# Patient Record
Sex: FEMALE | Race: WHITE | NOT HISPANIC OR LATINO
[De-identification: names, ages, dates, MRNs, and addresses within clinical notes are randomized per-mention and may not be internally consistent; named-entity substitution may affect disease eponyms.]

---

## 2024-01-02 ENCOUNTER — NON-APPOINTMENT (OUTPATIENT)
Age: 34
End: 2024-01-02

## 2024-01-07 ENCOUNTER — EMERGENCY (EMERGENCY)
Facility: HOSPITAL | Age: 34
LOS: 1 days | Discharge: HOME | End: 2024-01-07
Attending: EMERGENCY MEDICINE
Payer: MEDICARE

## 2024-01-07 VITALS
OXYGEN SATURATION: 94 % | HEART RATE: 97 BPM | TEMPERATURE: 98 F | WEIGHT: 190.04 LBS | RESPIRATION RATE: 16 BRPM | SYSTOLIC BLOOD PRESSURE: 114 MMHG | DIASTOLIC BLOOD PRESSURE: 75 MMHG

## 2024-01-07 PROCEDURE — 99283 EMERGENCY DEPT VISIT LOW MDM: CPT

## 2024-01-07 PROCEDURE — 87186 SC STD MICRODIL/AGAR DIL: CPT

## 2024-01-07 PROCEDURE — 10060 I&D ABSCESS SIMPLE/SINGLE: CPT

## 2024-01-07 PROCEDURE — 82962 GLUCOSE BLOOD TEST: CPT

## 2024-01-07 PROCEDURE — 87077 CULTURE AEROBIC IDENTIFY: CPT

## 2024-01-07 PROCEDURE — 99284 EMERGENCY DEPT VISIT MOD MDM: CPT | Mod: 25

## 2024-01-07 PROCEDURE — 87070 CULTURE OTHR SPECIMN AEROBIC: CPT

## 2024-01-07 RX ORDER — OXYCODONE AND ACETAMINOPHEN 5; 325 MG/1; MG/1
1 TABLET ORAL ONCE
Refills: 0 | Status: DISCONTINUED | OUTPATIENT
Start: 2024-01-07 | End: 2024-01-07

## 2024-01-07 RX ORDER — ACETAMINOPHEN 500 MG
650 TABLET ORAL ONCE
Refills: 0 | Status: COMPLETED | OUTPATIENT
Start: 2024-01-07 | End: 2024-01-07

## 2024-01-07 RX ORDER — CEPHALEXIN 500 MG
1 CAPSULE ORAL
Qty: 21 | Refills: 0
Start: 2024-01-07 | End: 2024-01-13

## 2024-01-07 RX ORDER — LIDOCAINE HCL 20 MG/ML
5 VIAL (ML) INJECTION ONCE
Refills: 0 | Status: COMPLETED | OUTPATIENT
Start: 2024-01-07 | End: 2024-01-07

## 2024-01-07 RX ORDER — SACCHAROMYCES BOULARDII 250 MG
1 POWDER IN PACKET (EA) ORAL
Qty: 20 | Refills: 0
Start: 2024-01-07 | End: 2024-01-16

## 2024-01-07 RX ADMIN — OXYCODONE AND ACETAMINOPHEN 1 TABLET(S): 5; 325 TABLET ORAL at 05:42

## 2024-01-07 RX ADMIN — Medication 5 MILLILITER(S): at 05:45

## 2024-01-07 RX ADMIN — Medication 650 MILLIGRAM(S): at 06:12

## 2024-01-07 NOTE — ED PROVIDER NOTE - NSFOLLOWUPINSTRUCTIONS_ED_ALL_ED_FT
continue antibiotics Bactrim DS twice a daily every 12 hours and Keflex every 8 hours   take the probiotic as prescribed  - Tylenol 650 mg alternative ibuprofen 600 mg every 6-8 hours as needed for the pain  - come back to the emergency room for 3 days for wound check and packing removal-  or if he can follow-up with surgery clinic for further evaluation  - continue applying a warm compress over the area  Incision and Drainage, Care After    This sheet gives you information about how to care for yourself after your procedure. Your health care provider may also give you more specific instructions. If you have problems or questions, contact your health care provider.    What can I expect after the procedure?  After the procedure, it is common to have:    Pain or discomfort around the incision site.  Blood, fluid, or pus (drainage) from the incision.  Redness and firm skin around the incision site.    Follow these instructions at home:      Medicines    Take over-the-counter and prescription medicines only as told by your health care provider.  If you were prescribed an antibiotic medicine, use or take it as told by your health care provider. Do not stop using the antibiotic even if you start to feel better.        Wound care     Follow instructions from your health care provider about how to take care of your wound. Make sure you:    Wash your hands with soap and water before and after you change your bandage (dressing). If soap and water are not available, use hand .  Change your dressing and packing as told by your health care provider.    If your dressing is dry or stuck when you try to remove it, moisten or wet the dressing with saline or water so that it can be removed without harming your skin or tissues.  If your wound is packed, leave it in place until your health care provider tells you to remove it. To remove the packing, moisten or wet the packing with saline or water so that it can be removed without harming your skin or tissues.  Leave stitches (sutures), skin glue, or adhesive strips in place. These skin closures may need to stay in place for 2 weeks or longer. If adhesive strip edges start to loosen and curl up, you may trim the loose edges. Do not remove adhesive strips completely unless your health care provider tells you to do that.    Check your wound every day for signs of infection. Check for:    More redness, swelling, or pain.  More fluid or blood.  Warmth.  Pus or a bad smell.    If you were sent home with a drain tube in place, follow instructions from your health care provider about:    How to empty it.  How to care for it at home.        General instructions    Rest the affected area.  Do not take baths, swim, or use a hot tub until your health care provider approves. Ask your health care provider if you may take showers. You may only be allowed to take sponge baths.  Return to your normal activities as told by your health care provider. Ask your health care provider what activities are safe for you. Your health care provider may put you on activity or lifting restrictions.  The incision will continue to drain. It is normal to have some clear or slightly bloody drainage. The amount of drainage should lessen each day.  Do not apply any creams, ointments, or liquids unless you have been told to by your health care provider.  Keep all follow-up visits as told by your health care provider. This is important.    Contact a health care provider if:  Your cyst or abscess returns.  You have a fever or chills.  You have more redness, swelling, or pain around your incision.  You have more fluid or blood coming from your incision.  Your incision feels warm to the touch.  You have pus or a bad smell coming from your incision.  You have red streaks above or below the incision site.    Get help right away if:  You have severe pain or bleeding.  You cannot eat or drink without vomiting.  You have decreased urine output.  You become short of breath.  You have chest pain.  You cough up blood.  The affected area becomes numb or starts to tingle.    These symptoms may represent a serious problem that is an emergency. Do not wait to see if the symptoms will go away. Get medical help right away. Call your local emergency services (911 in the [place] Do not drive yourself to the hospital.    Summary  After this procedure, it is common to have fluid, blood, or pus coming from the surgery site.  Follow all home care instructions. You will be told how to take care of your incision, how to check for infection, and how to take medicines.  If you were prescribed an antibiotic medicine, take it as told by your health care provider. Do not stop taking the antibiotic even if you start to feel better.  Contact a health care provider if you have increased redness, swelling, or pain around your incision. Get help right away if you have chest pain, you vomit, you cough up blood, or you have shortness of breath.  Keep all follow-up visits as told by your health care provider. This is important.    ADDITIONAL NOTES AND INSTRUCTIONS    Please follow up with your Primary MD in 24-48 hr.  Seek immediate medical care for any new/worsening signs or symptoms.

## 2024-01-07 NOTE — ED ADULT NURSE NOTE - NSFALLUNIVINTERV_ED_ALL_ED
Bed/Stretcher in lowest position, wheels locked, appropriate side rails in place/Call bell, personal items and telephone in reach/Instruct patient to call for assistance before getting out of bed/chair/stretcher/Non-slip footwear applied when patient is off stretcher/[place] to call system/Physically safe environment - no spills, clutter or unnecessary equipment/Purposeful proactive rounding/Room/bathroom lighting operational, light cord in reach Bed/Stretcher in lowest position, wheels locked, appropriate side rails in place/Call bell, personal items and telephone in reach/Instruct patient to call for assistance before getting out of bed/chair/stretcher/Non-slip footwear applied when patient is off stretcher/[place] to call system/Physically safe environment - no spills, clutter or unnecessary equipment/Purposeful proactive rounding/Room/bathroom lighting operational, light cord in reach

## 2024-01-07 NOTE — ED PROVIDER NOTE - ATTENDING APP SHARED VISIT CONTRIBUTION OF CARE
cutaneous abscess of left upper back; no fever; no axillary LAD; mild surrounding erythema +ttp with induration; s/p I&D as per ACP with improvement; antibxs and symptomatic treatment recommended and understood; agree with acp plan of care    This was a shared visit with MARYLIN. I reviewed and verified the documentation and independently performed the documented history/exam/mdm.

## 2024-01-07 NOTE — ED ADULT TRIAGE NOTE - CHIEF COMPLAINT QUOTE
Pt BIBA from home, c/o lump on left upper back x 1 week w/pain and yellow/green puss drainage.  Pt seen at UC 3 days ago, given abx, states it popped twice and excessive drainage.

## 2024-01-07 NOTE — ED PROCEDURE NOTE - CPROC ED LOCAL ANESTHESIA1
Call your physician immediately if you notice any of the following symptoms of a blood clot:   Sudden weakness in any limb  Numbness or tingling anywhere  Visual changes or loss of sight in either eye  Sudden onset of slurred speech or inability to speak  Dizziness or faintness  New pain, swelling, redness or heat in any extremity  New SOB or chest pain     8/2% lidocaine

## 2024-01-07 NOTE — ED PROVIDER NOTE - PATIENT PORTAL LINK FT
You can access the FollowMyHealth Patient Portal offered by [facility] by registering at the following website: [email or web address]. By joining [employer]’s FollowMyHealth portal, you will also be able to view your health information using other applications (apps) compatible with our system. You can access the FollowMyHealth Patient Portal offered by [facility] by registering at the following website: [email or web address]. By joining [employer] [facility]’s FollowMyHealth portal, you will also be able to view your health information using other applications (apps) compatible with our system.

## 2024-01-07 NOTE — ED PROVIDER NOTE - NSFOLLOWUPCLINICS_GEN_ALL_ED_FT
[facility] General Surgery  Surgery  [address] [place] [address]  Phone: [phone]  Fax:   Follow Up Time: 1-3 Days     [facility] General Surgery  Surgery  [address] [place] [address]  Phone: [phone]  Fax:   Follow Up Time: 1-3 Days

## 2024-01-07 NOTE — ED PROVIDER NOTE - OBJECTIVE STATEMENT
33 years old female no past medical history presented emergency room complaining of the abscess on her upper back that she had this for 1 week.  start from small pimple gets bigger over the time.  she had a follow-up with urgent care they started her on antibiotics? Bactrim she is taking it twice daily.  now is draining out.  she is taking ibuprofen 600 mg last dose was 2 hours PTA.  denies any fever at home.  patient states the  area of the abscess it hurts a lot requesting pain medication.  denies any pregnancy she is currently on her menstruation 33 years old female Past medical history of the gestational diabetes during pregnancy presented emergency room  with her  and children complaining of the abscess on her upper back that she had this for 1 week.  start from small pimple gets bigger over the time.  she had a follow-up with urgent care they started her on antibiotics? Bactrim she is taking it twice daily.  now is draining out.  she is taking ibuprofen 600 mg last dose was 2 hours PTA.  denies any fever at home.  patient states the  area of the abscess it hurts a lot requesting pain medication.  denies any pregnancy she is currently on her menstruation

## 2024-01-07 NOTE — ED PROVIDER NOTE - CLINICAL SUMMARY MEDICAL DECISION MAKING FREE TEXT BOX
33 years old female no past medical history presented emergency room complaining of the abscess on her upper back that she had this for 1 week.  start from small pimple gets bigger over the time.  she had a follow-up with urgent care they started her on antibiotics? Bactrim she is taking it twice daily.  now is draining out.  she is taking ibuprofen 600 mg last dose was 2 hours PTA.  denies any fever at home.  patient states the  area of the abscess it hurts a lot requesting pain medication.  denies any pregnancy she is currently on her menstruation     performing I&D,  pain mid Percocet 5 mg.  continue Bactrim patient has a few tabs at home

## 2024-01-07 NOTE — ED PROCEDURE NOTE - CPROC ED TIME OUT STATEMENT1
“Patient's name, , procedure and correct site were confirmed during the [place] Timeout.” “Patient's name, , procedure and correct site were confirmed during the [place] Timeout.”

## 2024-01-07 NOTE — ED ADULT NURSE NOTE - OBJECTIVE STATEMENT
Pt is A&Ox4. Respirations are even and unlabored. Color is appropriate for race. Skin warm and dry. Pt reports lump on left upper back x 1 week w/pain and yellow/green puss drainage.  Pt seen at  3 days ago, given abx, states it popped twice and excessive drainage. Lump appears to be size of quarter, +drainage, and + foul smelling order noted to left upper back. ED provider evaluating, plan of care ongoing.

## 2024-01-07 NOTE — ED PROVIDER NOTE - PHYSICAL EXAMINATION
Const: AOX3 nontoxic appearing, no apparent respiratory or physical distress. obese female   HEENT: NC/AT. Moist mucous membranes.  Eyes: BALTAZAR. EOMI  Neck: Soft and supple. Full ROM without pain.  Cardiac: Regular rate and regular rhythm. +S1/S2  Resp: Speaking in full sentences. No evidence of respiratory distress.  Skin:  left upper back almost near the scapular area 4 cm abscess noted- yellow on the top some heart and small amount of the fluctuation noted. and surrounding erythema noted  Neuro: Awake, alert & oriented x 3. Moves all extremities symmetrically. Const: AOX3 nontoxic appearing, no apparent respiratory or physical distress. obese female   HEENT: NC/AT. Moist mucous membranes.  Eyes: BALTAZAR. EOMI  Neck: Soft and supple. Full ROM without pain.  Cardiac: Regular rate and regular rhythm. +S1/S2  Resp: Speaking in full sentences. No evidence of respiratory distress.  Skin:  left upper back almost near the scapular area 4 cm abscess noted- yellow on the top some heart and small amount of the fluctuation noted. surrounding erythema noted.  +  right axilla adenopathy  Neuro: Awake, alert & oriented x 3. Moves all extremities symmetrically.

## 2024-01-09 LAB
-  AMPICILLIN/SULBACTAM: SIGNIFICANT CHANGE UP
-  AMPICILLIN/SULBACTAM: SIGNIFICANT CHANGE UP
-  CEFAZOLIN: SIGNIFICANT CHANGE UP
-  CEFAZOLIN: SIGNIFICANT CHANGE UP
-  CLINDAMYCIN: SIGNIFICANT CHANGE UP
-  CLINDAMYCIN: SIGNIFICANT CHANGE UP
-  DAPTOMYCIN: SIGNIFICANT CHANGE UP
-  DAPTOMYCIN: SIGNIFICANT CHANGE UP
-  ERYTHROMYCIN: SIGNIFICANT CHANGE UP
-  ERYTHROMYCIN: SIGNIFICANT CHANGE UP
-  GENTAMICIN: SIGNIFICANT CHANGE UP
-  GENTAMICIN: SIGNIFICANT CHANGE UP
-  LINEZOLID: SIGNIFICANT CHANGE UP
-  LINEZOLID: SIGNIFICANT CHANGE UP
-  OXACILLIN: SIGNIFICANT CHANGE UP
-  OXACILLIN: SIGNIFICANT CHANGE UP
-  PENICILLIN: SIGNIFICANT CHANGE UP
-  PENICILLIN: SIGNIFICANT CHANGE UP
-  RIFAMPIN: SIGNIFICANT CHANGE UP
-  RIFAMPIN: SIGNIFICANT CHANGE UP
-  TETRACYCLINE: SIGNIFICANT CHANGE UP
-  TETRACYCLINE: SIGNIFICANT CHANGE UP
-  TRIMETHOPRIM/SULFAMETHOXAZOLE: SIGNIFICANT CHANGE UP
-  TRIMETHOPRIM/SULFAMETHOXAZOLE: SIGNIFICANT CHANGE UP
-  VANCOMYCIN: SIGNIFICANT CHANGE UP
-  VANCOMYCIN: SIGNIFICANT CHANGE UP
CULTURE RESULTS: ABNORMAL
METHOD TYPE: SIGNIFICANT CHANGE UP
METHOD TYPE: SIGNIFICANT CHANGE UP
ORGANISM # SPEC MICROSCOPIC CNT: ABNORMAL
ORGANISM # SPEC MICROSCOPIC CNT: ABNORMAL
ORGANISM # SPEC MICROSCOPIC CNT: SIGNIFICANT CHANGE UP
ORGANISM # SPEC MICROSCOPIC CNT: SIGNIFICANT CHANGE UP
SPECIMEN SOURCE: SIGNIFICANT CHANGE UP
SPECIMEN SOURCE: SIGNIFICANT CHANGE UP

## 2024-01-10 ENCOUNTER — EMERGENCY (EMERGENCY)
Facility: HOSPITAL | Age: 34
LOS: 1 days | Discharge: HOME | End: 2024-01-10
Attending: EMERGENCY MEDICINE
Payer: MEDICARE

## 2024-01-10 VITALS
HEART RATE: 92 BPM | SYSTOLIC BLOOD PRESSURE: 169 MMHG | RESPIRATION RATE: 16 BRPM | DIASTOLIC BLOOD PRESSURE: 99 MMHG | OXYGEN SATURATION: 96 % | TEMPERATURE: 99 F

## 2024-01-10 VITALS
HEART RATE: 119 BPM | SYSTOLIC BLOOD PRESSURE: 170 MMHG | OXYGEN SATURATION: 95 % | DIASTOLIC BLOOD PRESSURE: 100 MMHG | TEMPERATURE: 99 F | WEIGHT: 190.04 LBS | RESPIRATION RATE: 18 BRPM | HEIGHT: 61 IN

## 2024-01-10 PROCEDURE — 99284 EMERGENCY DEPT VISIT MOD MDM: CPT

## 2024-01-10 PROCEDURE — 99283 EMERGENCY DEPT VISIT LOW MDM: CPT

## 2024-01-10 RX ORDER — IBUPROFEN 200 MG
600 TABLET ORAL ONCE
Refills: 0 | Status: COMPLETED | OUTPATIENT
Start: 2024-01-10 | End: 2024-01-10

## 2024-01-10 RX ORDER — OXYCODONE AND ACETAMINOPHEN 5; 325 MG/1; MG/1
1 TABLET ORAL ONCE
Refills: 0 | Status: DISCONTINUED | OUTPATIENT
Start: 2024-01-10 | End: 2024-01-10

## 2024-01-10 RX ORDER — IBUPROFEN 200 MG
1 TABLET ORAL
Qty: 20 | Refills: 0
Start: 2024-01-10 | End: 2024-01-14

## 2024-01-10 RX ADMIN — Medication 600 MILLIGRAM(S): at 18:40

## 2024-01-10 RX ADMIN — OXYCODONE AND ACETAMINOPHEN 1 TABLET(S): 5; 325 TABLET ORAL at 18:40

## 2024-01-10 RX ADMIN — Medication 2 TABLET(S): at 18:40

## 2024-01-10 NOTE — ED PROVIDER NOTE - ATTENDING APP SHARED VISIT CONTRIBUTION OF CARE
I, Denis Alexandra, performed the initial face to face bedside interview with this patient regarding history of present illness, review of symptoms and relevant past medical, social and family history.  I completed an independent physical examination.  I was the initial provider who evaluated this patient. I have signed out the follow up of any pending tests (i.e. labs, radiological studies) to the ACP.  I have communicated the patient’s plan of care and disposition with the ACP.

## 2024-01-10 NOTE — ED PROVIDER NOTE - PROGRESS NOTE DETAILS
Gil Diaz PA-C: Pt will require daily wet to dry dressing changes, superficial debridements, and Cleasing with soap and water.

## 2024-01-10 NOTE — ED PROVIDER NOTE - NSFOLLOWUPCLINICSTOKEN_GEN_ALL_ED_FT
[id number]: || ||00\01||False;[id number]: || ||00\01||False; [id number]: || ||00\01||False;[id number]: || ||00\01||False; [id number]: || ||00\01||False;[id number]: || ||00\01||False;

## 2024-01-10 NOTE — DISCHARGE NOTE NURSING/CASE MANAGEMENT/SOCIAL WORK - NSDCFUADDAPPT_GEN_ALL_CORE_FT
[facility] to start Home Care Nursing upon approval, please contact above number for more information.  [facility] to start Home Care Nursing upon approval, please contact above number for more information.

## 2024-01-10 NOTE — ED ADULT NURSE NOTE - NSFALLUNIVINTERV_ED_ALL_ED
Bed/Stretcher in lowest position, wheels locked, appropriate side rails in place/Call bell, personal items and telephone in reach/Instruct patient to call for assistance before getting out of bed/chair/stretcher/Non-slip footwear applied when patient is off stretcher/[place] to call system/Physically safe environment - no spills, clutter or unnecessary equipment/Purposeful proactive rounding/Room/bathroom lighting operational, light cord in reach Bed/Stretcher in lowest position, wheels locked, appropriate side rails in place/Call bell, personal items and telephone in reach/Instruct patient to call for assistance before getting out of bed/chair/stretcher/Non-slip footwear applied when patient is off stretcher/[place] to call system/Physically safe environment - no spills, clutter or unnecessary equipment/Purposeful proactive rounding/Room/bathroom lighting operational, light cord in reach

## 2024-01-10 NOTE — ED PROVIDER NOTE - PATIENT PORTAL LINK FT
You can access the FollowMyHealth Patient Portal offered by [facility] by registering at the following website: [email or web address]. By joining [employer]’s FollowMyHealth portal, you will also be able to view your health information using other applications (apps) compatible with our system. You can access the FollowMyHealth Patient Portal offered by [facility] by registering at the following website: [email or web address]. By joining [employer]’s FollowMyHealth portal, you will also be able to view your health information using other applications (apps) compatible with our system. You can access the FollowMyHealth Patient Portal offered by [facility] by registering at the following website: [email or web address]. By joining [employer]’s FollowMyHealth portal, you will also be able to view your health information using other applications (apps) compatible with our system.

## 2024-01-10 NOTE — DISCHARGE NOTE NURSING/CASE MANAGEMENT/SOCIAL WORK - PATIENT PORTAL LINK FT
You can access the FollowMyHealth Patient Portal offered by [facility] by registering at the following website: [email or web address]. By joining [employer]’s FollowMyHealth portal, you will also be able to view your health information using other applications (apps) compatible with our system. You can access the FollowMyHealth Patient Portal offered by [facility] by registering at the following website: [email or web address]. By joining [employer]’s FollowMyHealth portal, you will also be able to view your health information using other applications (apps) compatible with our system.

## 2024-01-10 NOTE — ED PROVIDER NOTE - CLINICAL SUMMARY MEDICAL DECISION MAKING FREE TEXT BOX
Patient with no significant past medical history presents to the emergency department for evaluation of wound over left shoulder.  Patient states that she was here recently had an I&D, has not gotten antibiotics from the pharmacy as prescribed.  Patient states that she feels wound is improved but not resolved.  Patient with some moderate amount of pain over the area that is not made better or worse by anything.  Patient denies fever chills nausea vomiting headache dizziness weakness.  On exam patient with 3 cm ulcer with active greenish discharge without surrounding erythema or swelling.  Cultures from last visit reviewed antibiotics resent to the pharmacy patient will require wet-to-dry dressing for ulcerative wound contacted case management to help arrange follow-up patient be discharged home as above follow-up surgical clinic.  Patient educated about when to return to the ED if needed. PT verbalizes that he understands all instructions and results. Pt infomred that ED is open and availible 24/7 365 days a yr, encouraged to return to the ED if they have any change in condition, or feel the need for revaluation.

## 2024-01-10 NOTE — ED PROVIDER NOTE - NSFOLLOWUPCLINICS_GEN_ALL_ED_FT
[address] [place] [address]  Phone: [phone]  Fax:     [facility] Acute Care Surgery  Acute Care Surgery  [address] [place] [address]  Phone: [phone]  Fax:      [id number] [place] [address]  Phone: [phone]  Fax:     [facility] Acute Care Surgery  Acute Care Surgery  [address] [place] [address]  Phone: [phone]  Fax:      [address] [place] [address]  Phone: [phone]  Fax:     [facility] Acute Care Surgery  Acute Care Surgery  [address] [place] [address]  Phone: [phone]  Fax:

## 2024-01-10 NOTE — ED PROVIDER NOTE - NSFOLLOWUPINSTRUCTIONS_ED_ALL_ED_FT
Patient education: Skin abscess (The Basics)  Written by the doctors and editors at [facility]  Please read the Disclaimer at the end of this page.    What is a skin abscess?  A skin abscess is a painful bump that forms when pus collects under the skin (picture 1). It looks similar to a pimple, but is usually much larger. Skin abscesses most often form when there is cut or nick in the skin that allows bacteria from the skin's surface to get in. Sometimes, an ingrown hair can cause a skin abscess to form.    The bacterial infection causes the skin to become swollen and painful. It also makes the skin look red or darker in color. Pus forms as the body tries to fight off the bacteria.    Less often, a skin abscess might be caused by another type of germ, like a virus, fungus, or parasite.    What are the symptoms of a skin abscess?  Symptoms might include:    ?A large bump that looks like a pimple – The bump might drain fluid or pus.    ?Swollen or red skin around the abscess    ?Pain, especially when touched    In some cases, a skin abscess might also cause fever or chills. But this is uncommon.    Skin abscesses can form anywhere on the body. But they are most common on the arms, legs, back, chest, and belly.    Will I need tests?  Probably not. In most cases, your doctor or nurse can tell if you have a skin abscess by asking about your symptoms and doing an exam.    In rare cases when tests are needed, they might include:    ?Lab tests    ?Imaging tests – These create pictures of the inside of the body.    In some cases, your doctor or nurse might want to do lab tests on the pus from the abscess.    How is a skin abscess treated?  With most abscesses, a doctor will make a small cut on the surface of the abscess to drain out the pus. For small abscesses that are draining pus on their own, cutting into the abscess might not be needed. "Small" usually means less than 3/4 inch (2 cm) in size.    In most cases, your doctor or nurse will also prescribe an antibiotic medicine. Antibiotics help kill the bacteria that caused the abscess and keep the skin from getting infected again. If you get antibiotics, finish all of the medicine and take it exactly as instructed. Never skip doses or stop taking the medicine without talking to your doctor or nurse.    Is there anything I can do on my own to feel better?  Yes. To relieve symptoms and help your skin abscess drain, you can put a warm, wet compress on the area:    ?Wet a clean washcloth with warm water, and put it over your abscess.    ?When the washcloth cools, reheat it with warm water and put it back over the abscess.    ?Repeat these steps for about 15 minutes, and try to do this 4 times a day.    Do not try to squeeze or pop an abscess. This can make it worse.    When should I call the doctor?  Call your doctor or nurse if:    ?Your abscess is getting bigger.    ?You have signs that your infection is getting worse – These include a fever of 100.4°F (38°C) or higher, or more redness around the abscess.    ?Your abscess is on your breast or in your genital or anal area. Patient education: Skin abscess (The Basics)  Written by the doctors and editors at [facility]  Please read the Disclaimer at the end of this page.    What is a skin abscess?  A skin abscess is a painful bump that forms when pus collects under the skin (picture 1). It looks similar to a pimple, but is usually much larger. Skin abscesses most often form when there is cut or nick in the skin that allows bacteria from the skin's surface to get in. Sometimes, an ingrown hair can cause a skin abscess to form.    The bacterial infection causes the skin to become swollen and painful. It also makes the skin look red or darker in color. Pus forms as the body tries to fight off the bacteria.    Less often, a skin abscess might be caused by another type of germ, like a virus, fungus, or parasite.    What are the symptoms of a skin abscess?  Symptoms might include:    ?A large bump that looks like a pimple – The bump might drain fluid or pus.    ?Swollen or red skin around the abscess    ?Pain, especially when touched    In some cases, a skin abscess might also cause fever or chills. But this is uncommon.    Skin abscesses can form anywhere on the body. But they are most common on the arms, legs, back, chest, and belly.    Will I need tests?  Probably not. In most cases, your doctor or nurse can tell if you have a skin abscess by asking about your symptoms and doing an exam.    In rare cases when tests are needed, they might include:    ?Lab tests    ?Imaging tests – These create pictures of the inside of the body.    In some cases, your doctor or nurse might want to do lab tests on the pus from the abscess.    How is a skin abscess treated?  With most abscesses, a doctor will make a small cut on the surface of the abscess to drain out the pus. For small abscesses that are draining pus on their own, cutting into the abscess might not be needed. "Small" usually means less than 3/4 inch (2 cm) in size.    In most cases, your doctor or nurse will also prescribe an antibiotic medicine. Antibiotics help kill the bacteria that caused the abscess and keep the skin from getting infected again. If you get antibiotics, finish all of the medicine and take it exactly as instructed. Never skip doses or stop taking the medicine without talking to your doctor or nurse.    Is there anything I can do on my own to feel better?  Yes. To relieve symptoms and help your skin abscess drain, you can put a warm, wet compress on the area:    ?Wet a clean washcloth with warm water, and put it over your abscess.    ?When the washcloth cools, reheat it with warm water and put it back over the abscess.    ?Repeat these steps for about 15 minutes, and try to do this 4 times a day.    Do not try to squeeze or pop an abscess. This can make it worse.    When should I call the doctor?  Call your doctor or nurse if:    ?Your abscess is getting bigger.    ?You have signs that your infection is getting worse – These include a fever of 100.4°F (38°C) or higher, or more redness around the abscess.    ?Your abscess is on your breast or in your genital or anal area. Patient education: Skin abscess (The Basics)  Written by the doctors and editors at [facility]  Please read the Disclaimer at the end of this page.    What is a skin abscess?  A skin abscess is a painful bump that forms when pus collects under the skin (picture 1). It looks similar to a pimple, but is usually much larger. Skin abscesses most often form when there is cut or nick in the skin that allows bacteria from the skin's surface to get in. Sometimes, an ingrown hair can cause a skin abscess to form.    The bacterial infection causes the skin to become swollen and painful. It also makes the skin look red or darker in color. Pus forms as the body tries to fight off the bacteria.    Less often, a skin abscess might be caused by another type of germ, like a virus, fungus, or parasite.    What are the symptoms of a skin abscess?  Symptoms might include:    ?A large bump that looks like a pimple – The bump might drain fluid or pus.    ?Swollen or red skin around the abscess    ?Pain, especially when touched    In some cases, a skin abscess might also cause fever or chills. But this is uncommon.    Skin abscesses can form anywhere on the body. But they are most common on the arms, legs, back, chest, and belly.    Will I need tests?  Probably not. In most cases, your doctor or nurse can tell if you have a skin abscess by asking about your symptoms and doing an exam.    In rare cases when tests are needed, they might include:    ?Lab tests    ?Imaging tests – These create pictures of the inside of the body.    In some cases, your doctor or nurse might want to do lab tests on the pus from the abscess.    How is a skin abscess treated?  With most abscesses, a doctor will make a small cut on the surface of the abscess to drain out the pus. For small abscesses that are draining pus on their own, cutting into the abscess might not be needed. "Small" usually means less than 3/4 inch (2 cm) in size.    In most cases, your doctor or nurse will also prescribe an antibiotic medicine. Antibiotics help kill the bacteria that caused the abscess and keep the skin from getting infected again. If you get antibiotics, finish all of the medicine and take it exactly as instructed. Never skip doses or stop taking the medicine without talking to your doctor or nurse.    Is there anything I can do on my own to feel better?  Yes. To relieve symptoms and help your skin abscess drain, you can put a warm, wet compress on the area:    ?Wet a clean washcloth with warm water, and put it over your abscess.    ?When the washcloth cools, reheat it with warm water and put it back over the abscess.    ?Repeat these steps for about 15 minutes, and try to do this 4 times a day.    Do not try to squeeze or pop an abscess. This can make it worse.    When should I call the doctor?  Call your doctor or nurse if:    ?Your abscess is getting bigger.    ?You have signs that your infection is getting worse – These include a fever of 100.4°F (38°C) or higher, or more redness around the abscess.    ?Your abscess is on your breast or in your genital or anal area.

## 2024-01-10 NOTE — DISCHARGE NOTE NURSING/CASE MANAGEMENT/SOCIAL WORK - NSDCPEFALRISK_GEN_ALL_CORE
For information on Fall & Injury Prevention, visit: [email or web address] OR  [email or web address] OR  https://www.cdc.gov/steadi/patient.html For information on Fall & Injury Prevention, visit: [email or web address] OR  [email or web address] OR  https://www.cdc.gov/steadi/patient.html

## 2024-01-23 ENCOUNTER — APPOINTMENT (OUTPATIENT)
Dept: CARE COORDINATION | Facility: HOME HEALTH | Age: 34
End: 2024-01-23

## 2024-01-23 PROBLEM — Z00.00 ENCOUNTER FOR PREVENTIVE HEALTH EXAMINATION: Status: ACTIVE | Noted: 2024-01-23

## 2024-09-13 ENCOUNTER — NON-APPOINTMENT (OUTPATIENT)
Age: 34
End: 2024-09-13

## 2024-09-14 ENCOUNTER — NON-APPOINTMENT (OUTPATIENT)
Age: 34
End: 2024-09-14

## 2024-09-28 ENCOUNTER — NON-APPOINTMENT (OUTPATIENT)
Age: 34
End: 2024-09-28